# Patient Record
Sex: MALE | Race: WHITE | NOT HISPANIC OR LATINO | URBAN - METROPOLITAN AREA
[De-identification: names, ages, dates, MRNs, and addresses within clinical notes are randomized per-mention and may not be internally consistent; named-entity substitution may affect disease eponyms.]

---

## 2019-08-21 PROBLEM — 428283002 HISTORY OF POLYP OF COLON (SITUATION): Status: ACTIVE | Noted: 2019-05-31

## 2019-08-21 PROBLEM — 266435005 GASTRO-ESOPHAGEAL REFLUX DISEASE WITHOUT ESOPHAGITIS: Status: ACTIVE | Noted: 2019-08-21

## 2019-08-21 PROBLEM — 196735001 CSG - CHRONIC SUPERFICIAL GASTRITIS: Status: ACTIVE | Noted: 2019-08-21

## 2019-08-21 PROBLEM — 31297008 SOMATOFORM DISORDER: Status: ACTIVE | Noted: 2019-05-31

## 2019-08-21 PROBLEM — 235599003 EOSINOPHILIC ESOPHAGITIS: Status: ACTIVE | Noted: 2019-08-21

## 2020-12-07 ENCOUNTER — OFFICE VISIT (OUTPATIENT)
Dept: URBAN - METROPOLITAN AREA CLINIC 35 | Facility: CLINIC | Age: 60
End: 2020-12-07

## 2020-12-07 VITALS
HEART RATE: 67 BPM | DIASTOLIC BLOOD PRESSURE: 80 MMHG | OXYGEN SATURATION: 95 % | SYSTOLIC BLOOD PRESSURE: 122 MMHG | BODY MASS INDEX: 28.21 KG/M2 | WEIGHT: 208 LBS

## 2020-12-07 RX ORDER — ESOMEPRAZOLE MAGNESIUM 40 MG/1
1 CAPSULE CAPSULE, DELAYED RELEASE ORAL BID
Qty: 60 CAPSULE | Refills: 3 | Status: ACTIVE | COMMUNITY
Start: 2019-09-12

## 2020-12-07 RX ORDER — MONTELUKAST SODIUM 10 MG/1
1 TABLET TABLET ORAL QHS
Qty: 90 | Status: ACTIVE | COMMUNITY

## 2020-12-07 RX ORDER — FLUTICASONE FUROATE AND VILANTEROL TRIFENATATE 200; 25 UG/1; UG/1
1 PUFF POWDER RESPIRATORY (INHALATION) ONCE A DAY
Status: ACTIVE | COMMUNITY

## 2020-12-07 RX ORDER — PANTOPRAZOLE SODIUM 40 MG/1
1 TABLET TABLET, DELAYED RELEASE ORAL BID
Qty: 180 TABLET | Refills: 2 | Status: ON HOLD | COMMUNITY
Start: 2019-07-15

## 2020-12-07 RX ORDER — FLUTICASONE PROPIONATE 220 UG/1
2 PUFFS AEROSOL, METERED RESPIRATORY (INHALATION)
Qty: 1 | Refills: 1 | Status: ACTIVE | COMMUNITY
Start: 2019-08-21

## 2020-12-07 RX ORDER — DEXLANSOPRAZOLE 60 MG/1
1 CAPSULE CAPSULE, DELAYED RELEASE ORAL ONCE A DAY
Qty: 30 | Refills: 5 | Status: ON HOLD | COMMUNITY
Start: 2019-08-21

## 2020-12-07 RX ORDER — PANTOPRAZOLE SODIUM 40 MG/1
1 TABLET TABLET, DELAYED RELEASE ORAL ONCE A DAY
Qty: 90 | Refills: 4 | OUTPATIENT
Start: 2020-12-07

## 2020-12-07 NOTE — HPI-MIGRATED HPI
;     Cough : Patient is a 60-year-old  male who presents today to follow up for cough. He has been taking Nexium over the count without relief. He never had acid reflux or heartburn, except for the first time he took antacids. He also uses inhalers to help with cough without relief.  Last visit (08/21/2019)                 Patient presents today for follow up after his EGD.  Since the procedure patient denies dysphagia, globus, changes in appetite, and changes in bowel habits.  GE STUDY 08/12/2019 - Normal GE with t1/2 of 19 minutes. 2. Twenty four % of activity remaining in the stomach at 63 minutes.  Since last visit, patient has been taking Pantoprazole 40 mg BID but he continues to experience a nagging cough. He denies any symptoms of GERD, dysphagia since last visit.  On last visit 05/31/2019, Patient admits longstanding history of twenty years of a very persistent, at times dry cough. Patient states he feels like he needs to clear his throat all the time. This has been going on for at least 20 years. Symptoms are described as a nagging cough. Very occasional heartburn. Patient admits to coughing spells and described as violent enough to pass out. Patient states that he has tried Nexium with no relief of symptoms and is currently taking just allergy medications. Patient denies globus, sour eructations, bloating/gas, indigestion, early satiety, changes in appetite, coughing, abdominal/epigastric pain, or changes in bowel habits. Patient notes some dysphagia with swallowing pills.  He will get very rare heartburn.  Denies a family history of esophageal and gastric cancers and/or diseases. Patient has not had an EGD. Patient states that he has had 2 barium swallow tests with the most recent one being just one month ago. He was told that he had mild to moderate reflux and states that is when he referred to gastroenterology.  He had a barium swallow 18 years ago, and was told he had mild GERD.  He has had multiple CXRs which are normal.  He has been told he has a variant of asthma which causes him to cough. He had another barium swallow test two weeks ago as well and was told he had mild-to-moderate reflux.;    chest pain

## 2020-12-14 ENCOUNTER — TELEPHONE ENCOUNTER (OUTPATIENT)
Dept: URBAN - METROPOLITAN AREA CLINIC 35 | Facility: CLINIC | Age: 60
End: 2020-12-14

## 2020-12-14 RX ORDER — MONTELUKAST SODIUM 10 MG/1
1 TABLET TABLET ORAL QHS
Qty: 90 | Refills: 6 | OUTPATIENT

## 2020-12-14 RX ORDER — FLUTICASONE FUROATE AND VILANTEROL TRIFENATATE 200; 25 UG/1; UG/1
1 PUFF POWDER RESPIRATORY (INHALATION) ONCE A DAY
Qty: 60 | Refills: 6 | OUTPATIENT

## 2022-04-15 ENCOUNTER — OFFICE VISIT (OUTPATIENT)
Dept: URBAN - METROPOLITAN AREA CLINIC 33 | Facility: CLINIC | Age: 62
End: 2022-04-15

## 2022-04-29 ENCOUNTER — TELEPHONE ENCOUNTER (OUTPATIENT)
Dept: URBAN - METROPOLITAN AREA CLINIC 33 | Facility: CLINIC | Age: 62
End: 2022-04-29

## 2022-05-06 ENCOUNTER — OFFICE VISIT (OUTPATIENT)
Dept: URBAN - METROPOLITAN AREA CLINIC 33 | Facility: CLINIC | Age: 62
End: 2022-05-06
Payer: OTHER GOVERNMENT

## 2022-05-06 VITALS
DIASTOLIC BLOOD PRESSURE: 78 MMHG | WEIGHT: 197 LBS | SYSTOLIC BLOOD PRESSURE: 136 MMHG | OXYGEN SATURATION: 97 % | BODY MASS INDEX: 26.72 KG/M2 | HEART RATE: 75 BPM

## 2022-05-06 DIAGNOSIS — K21.9 GASTROESOPHAGEAL REFLUX DISEASE WITHOUT ESOPHAGITIS: ICD-10-CM

## 2022-05-06 DIAGNOSIS — R05.9 COUGH: ICD-10-CM

## 2022-05-06 PROBLEM — 266435005: Status: ACTIVE | Noted: 2022-05-06

## 2022-05-06 PROCEDURE — 99213 OFFICE O/P EST LOW 20 MIN: CPT | Performed by: INTERNAL MEDICINE

## 2022-05-06 RX ORDER — MONTELUKAST SODIUM 10 MG/1
1 TABLET TABLET ORAL QHS
Qty: 90 | Refills: 6 | Status: ON HOLD | COMMUNITY

## 2022-05-06 RX ORDER — FLUTICASONE PROPIONATE 220 UG/1
2 PUFFS AEROSOL, METERED RESPIRATORY (INHALATION)
Qty: 1 | Refills: 1 | Status: ON HOLD | COMMUNITY
Start: 2019-08-21

## 2022-05-06 RX ORDER — PANTOPRAZOLE SODIUM 40 MG/1
1 TABLET TABLET, DELAYED RELEASE ORAL BID
Qty: 180 TABLET | Refills: 2 | Status: ON HOLD | COMMUNITY
Start: 2019-07-15

## 2022-05-06 RX ORDER — ALBUTEROL SULFATE 90 UG/1
1 PUFF AS NEEDED AEROSOL, METERED RESPIRATORY (INHALATION)
Qty: 1 | Refills: 6 | OUTPATIENT
Start: 2022-05-06

## 2022-05-06 RX ORDER — PANTOPRAZOLE SODIUM 40 MG/1
1 TABLET TABLET, DELAYED RELEASE ORAL ONCE A DAY
Qty: 30 | Refills: 11 | OUTPATIENT
Start: 2022-05-06

## 2022-05-06 RX ORDER — FLUTICASONE FUROATE AND VILANTEROL TRIFENATATE 200; 25 UG/1; UG/1
1 PUFF POWDER RESPIRATORY (INHALATION) ONCE A DAY
Qty: 60 | Refills: 6 | Status: ON HOLD | COMMUNITY

## 2022-05-06 RX ORDER — DEXLANSOPRAZOLE 60 MG/1
1 CAPSULE CAPSULE, DELAYED RELEASE ORAL ONCE A DAY
Qty: 30 | Refills: 5 | Status: ON HOLD | COMMUNITY
Start: 2019-08-21

## 2022-05-06 RX ORDER — PANTOPRAZOLE SODIUM 40 MG/1
1 TABLET TABLET, DELAYED RELEASE ORAL ONCE A DAY
Qty: 90 | Refills: 4 | Status: ON HOLD | COMMUNITY
Start: 2020-12-07

## 2022-05-06 RX ORDER — ESOMEPRAZOLE MAGNESIUM 40 MG/1
1 CAPSULE CAPSULE, DELAYED RELEASE ORAL BID
Qty: 60 CAPSULE | Refills: 3 | Status: ON HOLD | COMMUNITY
Start: 2019-09-12

## 2022-05-06 NOTE — HPI-FOLLOW UP VISIT
62 y/o male patient presents today for follow up of eosinophilic esophagitis and GERD. Patient admits to continuance of associated symptoms. Prior to his EGD, patient was complaining of nagging cough, which he states that she continues to have.   Patient denies nausea, vomiting, dysphagia, excessive belching, globus, sour eructations, bloating/gas, indigestion, early satiety, changes in appetite, abdominal/epigastric pain, or changes in bowel habits. Patient admit taking Pantoprazole daily for relief of symptoms. Patient states that he has not had any recent changes in his medications.  Patient denies family hx of gastric/esophageal cancer or diseases. Patient admits past EGD which was performed in 2019 by .

## 2022-05-11 ENCOUNTER — OFFICE VISIT (OUTPATIENT)
Dept: URBAN - METROPOLITAN AREA CLINIC 31 | Facility: CLINIC | Age: 62
End: 2022-05-11

## 2023-06-05 ENCOUNTER — ERX REFILL RESPONSE (OUTPATIENT)
Dept: URBAN - METROPOLITAN AREA CLINIC 33 | Facility: CLINIC | Age: 63
End: 2023-06-05

## 2023-06-05 ENCOUNTER — ERX REFILL RESPONSE (OUTPATIENT)
Dept: URBAN - METROPOLITAN AREA CLINIC 35 | Facility: CLINIC | Age: 63
End: 2023-06-05

## 2023-06-05 RX ORDER — MONTELUKAST SODIUM 10 MG/1
TAKE 1 TABLET BY MOUTH EVERYDAY AT BEDTIME TABLET, FILM COATED ORAL
Qty: 90 TABLET | Refills: 6 | OUTPATIENT

## 2023-06-05 RX ORDER — MONTELUKAST SODIUM 10 MG/1
1 TABLET TABLET ORAL QHS
Qty: 90 | Refills: 6 | OUTPATIENT

## 2023-06-05 RX ORDER — ALBUTEROL SULFATE 108 UG/1
INHALE 1 PUFF INTO THE LUNGS EVERY 4 HOURS FOR 30 DAYS INHALANT RESPIRATORY (INHALATION)
Qty: 6.7 EACH | Refills: 6 | OUTPATIENT

## 2023-06-05 RX ORDER — ALBUTEROL SULFATE 90 UG/1
1 PUFF AS NEEDED AEROSOL, METERED RESPIRATORY (INHALATION)
Qty: 1 | Refills: 6 | OUTPATIENT

## 2023-11-02 ENCOUNTER — DASHBOARD ENCOUNTERS (OUTPATIENT)
Age: 63
End: 2023-11-02

## 2023-11-02 ENCOUNTER — OFFICE VISIT (OUTPATIENT)
Dept: URBAN - METROPOLITAN AREA CLINIC 35 | Facility: CLINIC | Age: 63
End: 2023-11-02
Payer: OTHER GOVERNMENT

## 2023-11-02 VITALS
DIASTOLIC BLOOD PRESSURE: 82 MMHG | SYSTOLIC BLOOD PRESSURE: 126 MMHG | HEIGHT: 72 IN | WEIGHT: 200 LBS | BODY MASS INDEX: 27.09 KG/M2

## 2023-11-02 DIAGNOSIS — K21.9 GASTROESOPHAGEAL REFLUX DISEASE WITHOUT ESOPHAGITIS: ICD-10-CM

## 2023-11-02 DIAGNOSIS — K20.0 EOSINOPHILIC ESOPHAGITIS: ICD-10-CM

## 2023-11-02 DIAGNOSIS — R05.9 COUGH: ICD-10-CM

## 2023-11-02 PROCEDURE — 99214 OFFICE O/P EST MOD 30 MIN: CPT | Performed by: PHYSICIAN ASSISTANT

## 2023-11-02 RX ORDER — DEXLANSOPRAZOLE 60 MG/1
1 CAPSULE CAPSULE, DELAYED RELEASE ORAL ONCE A DAY
Qty: 30 | Refills: 5 | Status: ON HOLD | COMMUNITY
Start: 2019-08-21

## 2023-11-02 RX ORDER — PANTOPRAZOLE SODIUM 40 MG/1
1 TABLET TABLET, DELAYED RELEASE ORAL BID
Qty: 180 TABLET | Refills: 2 | Status: ON HOLD | COMMUNITY
Start: 2019-07-15

## 2023-11-02 RX ORDER — PANTOPRAZOLE SODIUM 40 MG/1
1 TABLET TABLET, DELAYED RELEASE ORAL ONCE A DAY
Qty: 30 | Refills: 11 | Status: ACTIVE | COMMUNITY
Start: 2022-05-06

## 2023-11-02 RX ORDER — OMEPRAZOLE 20 MG/1
1 CAPSULE 30 MINUTES BEFORE MORNING MEAL CAPSULE, DELAYED RELEASE ORAL TWICE A DAY
Qty: 60 | Refills: 5 | OUTPATIENT
Start: 2023-11-02

## 2023-11-02 RX ORDER — ESOMEPRAZOLE MAGNESIUM 40 MG/1
1 CAPSULE CAPSULE, DELAYED RELEASE ORAL BID
Qty: 60 CAPSULE | Refills: 3 | Status: ON HOLD | COMMUNITY
Start: 2019-09-12

## 2023-11-02 RX ORDER — ALBUTEROL SULFATE 108 UG/1
INHALE 1 PUFF INTO THE LUNGS EVERY 4 HOURS FOR 30 DAYS INHALANT RESPIRATORY (INHALATION)
Qty: 6.7 EACH | Refills: 6 | Status: ACTIVE | COMMUNITY

## 2023-11-02 RX ORDER — MONTELUKAST SODIUM 10 MG/1
TAKE 1 TABLET BY MOUTH EVERYDAY AT BEDTIME TABLET, FILM COATED ORAL
Qty: 90 TABLET | Refills: 6 | Status: ON HOLD | COMMUNITY

## 2023-11-02 RX ORDER — ALBUTEROL SULFATE 90 UG/1
1 PUFF AS NEEDED AEROSOL, METERED RESPIRATORY (INHALATION)
Qty: 1 | Refills: 6 | OUTPATIENT

## 2023-11-02 RX ORDER — FLUTICASONE PROPIONATE 220 UG/1
2 PUFFS AEROSOL, METERED RESPIRATORY (INHALATION)
Qty: 1 | Refills: 1 | Status: ON HOLD | COMMUNITY
Start: 2019-08-21

## 2023-11-02 RX ORDER — PANTOPRAZOLE SODIUM 40 MG/1
1 TABLET TABLET, DELAYED RELEASE ORAL ONCE A DAY
Qty: 90 | Refills: 4 | Status: ON HOLD | COMMUNITY
Start: 2020-12-07

## 2023-11-02 RX ORDER — FLUTICASONE FUROATE AND VILANTEROL TRIFENATATE 200; 25 UG/1; UG/1
1 PUFF POWDER RESPIRATORY (INHALATION) ONCE A DAY
Qty: 60 | Refills: 6 | Status: ON HOLD | COMMUNITY

## 2023-11-02 NOTE — HPI-GERD
63 year old male patient presents today for a yearly follow up of eosinophilic esophagitis and GERD. Admits continued use of Pantoprazole 40mg with control of symptoms other than his persisting cough.  He has rare heartburn. Admits he has excessive coughing daily. He does admit to coughing daily for the past 30 years.  He was placed on Albuterol last visit and it helps some, but he's still coughing.  He denies nausea, vomiting, dysphagia, excessive belching, globus, sour eructations, bloating/gas, indigestion, early satiety, changes in appetite, abdominal/epigastric pain, or changes in bowel habits.   Last visit (5/6/2022): 60 y/o male patient presents today for follow up of eosinophilic esophagitis and GERD. Patient admits to continuance of associated symptoms. Prior to his EGD, patient was complaining of nagging cough, which he states that she continues to have.  Patient denies nausea, vomiting, dysphagia, excessive belching, globus, sour eructations, bloating/gas, indigestion, early satiety, changes in appetite, abdominal/epigastric pain, or changes in bowel habits. Patient admit taking Pantoprazole daily for relief of symptoms. Patient states that he has not had any recent changes in his medications.  Patient denies family hx of gastric/esophageal cancer or diseases. Patient admits past EGD which was performed in 2019 by .

## 2024-01-19 ENCOUNTER — OFFICE VISIT (OUTPATIENT)
Dept: URBAN - METROPOLITAN AREA CLINIC 35 | Facility: CLINIC | Age: 64
End: 2024-01-19